# Patient Record
Sex: MALE | Race: BLACK OR AFRICAN AMERICAN | NOT HISPANIC OR LATINO | URBAN - METROPOLITAN AREA
[De-identification: names, ages, dates, MRNs, and addresses within clinical notes are randomized per-mention and may not be internally consistent; named-entity substitution may affect disease eponyms.]

---

## 2017-01-25 ENCOUNTER — OFFICE VISIT - HEALTHEAST (OUTPATIENT)
Dept: GERIATRICS | Facility: CLINIC | Age: 67
End: 2017-01-25

## 2017-01-25 DIAGNOSIS — I10 ESSENTIAL HYPERTENSION WITH GOAL BLOOD PRESSURE LESS THAN 130/85: ICD-10-CM

## 2017-01-25 DIAGNOSIS — S09.90XS COGNITIVE DEFICIT DUE TO OLD HEAD INJURY: ICD-10-CM

## 2017-01-25 DIAGNOSIS — I69.391 DYSPHAGIA AS LATE EFFECT OF STROKE: ICD-10-CM

## 2017-01-25 DIAGNOSIS — S06.9XAA TBI (TRAUMATIC BRAIN INJURY) (H): ICD-10-CM

## 2017-01-25 DIAGNOSIS — G82.22 INCOMPLETE PARAPLEGIA (H): ICD-10-CM

## 2017-01-25 DIAGNOSIS — R41.89 COGNITIVE DEFICIT DUE TO OLD HEAD INJURY: ICD-10-CM

## 2017-03-14 ENCOUNTER — OFFICE VISIT - HEALTHEAST (OUTPATIENT)
Dept: GERIATRICS | Facility: CLINIC | Age: 67
End: 2017-03-14

## 2017-03-14 DIAGNOSIS — R41.89 COGNITIVE DEFICIT DUE TO OLD HEAD INJURY: ICD-10-CM

## 2017-03-14 DIAGNOSIS — E78.5 DYSLIPIDEMIA: ICD-10-CM

## 2017-03-14 DIAGNOSIS — I10 ESSENTIAL HYPERTENSION WITH GOAL BLOOD PRESSURE LESS THAN 130/85: ICD-10-CM

## 2017-03-14 DIAGNOSIS — I69.90 LATE EFFECTS OF CVA (CEREBROVASCULAR ACCIDENT): ICD-10-CM

## 2017-03-14 DIAGNOSIS — S09.90XS COGNITIVE DEFICIT DUE TO OLD HEAD INJURY: ICD-10-CM

## 2017-03-14 DIAGNOSIS — N18.2 CRI (CHRONIC RENAL INSUFFICIENCY), STAGE 2 (MILD): ICD-10-CM

## 2017-03-14 DIAGNOSIS — G82.22 INCOMPLETE PARAPLEGIA (H): ICD-10-CM

## 2017-03-14 ASSESSMENT — MIFFLIN-ST. JEOR: SCORE: 1543.73

## 2017-04-26 ENCOUNTER — OFFICE VISIT - HEALTHEAST (OUTPATIENT)
Dept: GERIATRICS | Facility: CLINIC | Age: 67
End: 2017-04-26

## 2017-04-26 DIAGNOSIS — R41.89 COGNITIVE DEFICIT DUE TO OLD HEAD INJURY: ICD-10-CM

## 2017-04-26 DIAGNOSIS — S09.90XS COGNITIVE DEFICIT DUE TO OLD HEAD INJURY: ICD-10-CM

## 2017-04-26 DIAGNOSIS — G82.22 INCOMPLETE PARAPLEGIA (H): ICD-10-CM

## 2017-04-26 DIAGNOSIS — I10 ESSENTIAL HYPERTENSION WITH GOAL BLOOD PRESSURE LESS THAN 130/85: ICD-10-CM

## 2017-04-26 DIAGNOSIS — I69.391 DYSPHAGIA AS LATE EFFECT OF STROKE: ICD-10-CM

## 2017-05-23 ENCOUNTER — OFFICE VISIT - HEALTHEAST (OUTPATIENT)
Dept: GERIATRICS | Facility: CLINIC | Age: 67
End: 2017-05-23

## 2017-05-23 DIAGNOSIS — G82.22 INCOMPLETE PARAPLEGIA (H): ICD-10-CM

## 2017-05-23 DIAGNOSIS — I69.90 LATE EFFECTS OF CVA (CEREBROVASCULAR ACCIDENT): ICD-10-CM

## 2017-05-23 DIAGNOSIS — N18.2 CRI (CHRONIC RENAL INSUFFICIENCY), STAGE 2 (MILD): ICD-10-CM

## 2017-05-23 DIAGNOSIS — S09.90XS COGNITIVE DEFICIT DUE TO OLD HEAD INJURY: ICD-10-CM

## 2017-05-23 DIAGNOSIS — I10 ESSENTIAL HYPERTENSION WITH GOAL BLOOD PRESSURE LESS THAN 130/85: ICD-10-CM

## 2017-05-23 DIAGNOSIS — R41.89 COGNITIVE DEFICIT DUE TO OLD HEAD INJURY: ICD-10-CM

## 2017-05-23 DIAGNOSIS — E78.5 DYSLIPIDEMIA: ICD-10-CM

## 2017-05-23 ASSESSMENT — MIFFLIN-ST. JEOR: SCORE: 1543.27

## 2017-06-13 ENCOUNTER — OFFICE VISIT - HEALTHEAST (OUTPATIENT)
Dept: GERIATRICS | Facility: CLINIC | Age: 67
End: 2017-06-13

## 2017-06-13 DIAGNOSIS — I69.90 LATE EFFECTS OF CVA (CEREBROVASCULAR ACCIDENT): ICD-10-CM

## 2017-06-13 DIAGNOSIS — R41.89 COGNITIVE DEFICIT DUE TO OLD HEAD INJURY: ICD-10-CM

## 2017-06-13 DIAGNOSIS — N18.2 CRI (CHRONIC RENAL INSUFFICIENCY), STAGE 2 (MILD): ICD-10-CM

## 2017-06-13 DIAGNOSIS — S09.90XS COGNITIVE DEFICIT DUE TO OLD HEAD INJURY: ICD-10-CM

## 2017-06-13 DIAGNOSIS — I10 ESSENTIAL HYPERTENSION WITH GOAL BLOOD PRESSURE LESS THAN 130/85: ICD-10-CM

## 2017-06-13 DIAGNOSIS — G82.22 INCOMPLETE PARAPLEGIA (H): ICD-10-CM

## 2017-06-13 DIAGNOSIS — E78.5 DYSLIPIDEMIA: ICD-10-CM

## 2017-06-13 ASSESSMENT — MIFFLIN-ST. JEOR: SCORE: 1543.27

## 2017-07-25 ENCOUNTER — OFFICE VISIT - HEALTHEAST (OUTPATIENT)
Dept: GERIATRICS | Facility: CLINIC | Age: 67
End: 2017-07-25

## 2017-07-25 DIAGNOSIS — S06.9XAA TBI (TRAUMATIC BRAIN INJURY) (H): ICD-10-CM

## 2017-07-25 DIAGNOSIS — S09.90XS COGNITIVE DEFICIT DUE TO OLD HEAD INJURY: ICD-10-CM

## 2017-07-25 DIAGNOSIS — R41.89 COGNITIVE DEFICIT DUE TO OLD HEAD INJURY: ICD-10-CM

## 2017-07-25 DIAGNOSIS — I10 ESSENTIAL HYPERTENSION WITH GOAL BLOOD PRESSURE LESS THAN 130/85: ICD-10-CM

## 2017-07-25 DIAGNOSIS — N18.2 CRI (CHRONIC RENAL INSUFFICIENCY), STAGE 2 (MILD): ICD-10-CM

## 2017-08-29 ENCOUNTER — OFFICE VISIT - HEALTHEAST (OUTPATIENT)
Dept: GERIATRICS | Facility: CLINIC | Age: 67
End: 2017-08-29

## 2017-08-29 DIAGNOSIS — S09.90XS COGNITIVE DEFICIT DUE TO OLD HEAD INJURY: ICD-10-CM

## 2017-08-29 DIAGNOSIS — G82.22 INCOMPLETE PARAPLEGIA (H): ICD-10-CM

## 2017-08-29 DIAGNOSIS — I69.90 LATE EFFECTS OF CVA (CEREBROVASCULAR ACCIDENT): ICD-10-CM

## 2017-08-29 DIAGNOSIS — R41.89 COGNITIVE DEFICIT DUE TO OLD HEAD INJURY: ICD-10-CM

## 2017-08-29 DIAGNOSIS — E78.5 DYSLIPIDEMIA: ICD-10-CM

## 2017-08-29 DIAGNOSIS — I10 ESSENTIAL HYPERTENSION WITH GOAL BLOOD PRESSURE LESS THAN 130/85: ICD-10-CM

## 2017-08-29 ASSESSMENT — MIFFLIN-ST. JEOR: SCORE: 1544.18

## 2017-09-23 ENCOUNTER — OFFICE VISIT - HEALTHEAST (OUTPATIENT)
Dept: GERIATRICS | Facility: CLINIC | Age: 67
End: 2017-09-23

## 2017-09-23 DIAGNOSIS — I69.90 LATE EFFECTS OF CVA (CEREBROVASCULAR ACCIDENT): ICD-10-CM

## 2017-09-23 DIAGNOSIS — R41.89 COGNITIVE DEFICIT DUE TO OLD HEAD INJURY: ICD-10-CM

## 2017-09-23 DIAGNOSIS — G82.22 INCOMPLETE PARAPLEGIA (H): ICD-10-CM

## 2017-09-23 DIAGNOSIS — I10 ESSENTIAL HYPERTENSION WITH GOAL BLOOD PRESSURE LESS THAN 130/85: ICD-10-CM

## 2017-09-23 DIAGNOSIS — I69.322 DYSARTHRIA AS LATE EFFECT OF STROKE: ICD-10-CM

## 2017-09-23 DIAGNOSIS — S09.90XS COGNITIVE DEFICIT DUE TO OLD HEAD INJURY: ICD-10-CM

## 2017-09-23 DIAGNOSIS — E78.5 DYSLIPIDEMIA: ICD-10-CM

## 2017-09-23 DIAGNOSIS — N18.2 CRI (CHRONIC RENAL INSUFFICIENCY), STAGE 2 (MILD): ICD-10-CM

## 2017-09-23 ASSESSMENT — MIFFLIN-ST. JEOR: SCORE: 1544.18

## 2017-10-13 ENCOUNTER — OFFICE VISIT - HEALTHEAST (OUTPATIENT)
Dept: GERIATRICS | Facility: CLINIC | Age: 67
End: 2017-10-13

## 2017-10-13 DIAGNOSIS — S09.90XS COGNITIVE DEFICIT DUE TO OLD HEAD INJURY: ICD-10-CM

## 2017-10-13 DIAGNOSIS — I10 ESSENTIAL HYPERTENSION WITH GOAL BLOOD PRESSURE LESS THAN 130/85: ICD-10-CM

## 2017-10-13 DIAGNOSIS — I69.322 DYSARTHRIA AS LATE EFFECT OF STROKE: ICD-10-CM

## 2017-10-13 DIAGNOSIS — S06.9XAA TBI (TRAUMATIC BRAIN INJURY) (H): ICD-10-CM

## 2017-10-13 DIAGNOSIS — G82.22 INCOMPLETE PARAPLEGIA (H): ICD-10-CM

## 2017-10-13 DIAGNOSIS — R41.89 COGNITIVE DEFICIT DUE TO OLD HEAD INJURY: ICD-10-CM

## 2017-10-13 RX ORDER — CITALOPRAM HYDROBROMIDE 20 MG/1
20 TABLET ORAL DAILY
Status: SHIPPED | COMMUNITY
Start: 2017-10-13

## 2017-11-24 ENCOUNTER — OFFICE VISIT - HEALTHEAST (OUTPATIENT)
Dept: GERIATRICS | Facility: CLINIC | Age: 67
End: 2017-11-24

## 2017-11-24 DIAGNOSIS — S09.90XS COGNITIVE DEFICIT DUE TO OLD HEAD INJURY: ICD-10-CM

## 2017-11-24 DIAGNOSIS — I10 ESSENTIAL HYPERTENSION: ICD-10-CM

## 2017-11-24 DIAGNOSIS — E78.5 DYSLIPIDEMIA: ICD-10-CM

## 2017-11-24 DIAGNOSIS — I69.90 LATE EFFECTS OF CVA (CEREBROVASCULAR ACCIDENT): ICD-10-CM

## 2017-11-24 DIAGNOSIS — R41.89 COGNITIVE DEFICIT DUE TO OLD HEAD INJURY: ICD-10-CM

## 2017-11-24 DIAGNOSIS — N18.2 CRI (CHRONIC RENAL INSUFFICIENCY), STAGE 2 (MILD): ICD-10-CM

## 2017-11-24 DIAGNOSIS — G82.22 INCOMPLETE PARAPLEGIA (H): ICD-10-CM

## 2017-11-24 ASSESSMENT — MIFFLIN-ST. JEOR: SCORE: 1536.47

## 2018-01-05 ENCOUNTER — OFFICE VISIT - HEALTHEAST (OUTPATIENT)
Dept: GERIATRICS | Facility: CLINIC | Age: 68
End: 2018-01-05

## 2018-01-05 DIAGNOSIS — N18.2 CRI (CHRONIC RENAL INSUFFICIENCY), STAGE 2 (MILD): ICD-10-CM

## 2018-01-05 DIAGNOSIS — R41.89 COGNITIVE DEFICIT DUE TO OLD HEAD INJURY: ICD-10-CM

## 2018-01-05 DIAGNOSIS — G82.22 INCOMPLETE PARAPLEGIA (H): ICD-10-CM

## 2018-01-05 DIAGNOSIS — I69.90 LATE EFFECTS OF CVA (CEREBROVASCULAR ACCIDENT): ICD-10-CM

## 2018-01-05 DIAGNOSIS — I10 ESSENTIAL HYPERTENSION: ICD-10-CM

## 2018-01-05 DIAGNOSIS — S09.90XS COGNITIVE DEFICIT DUE TO OLD HEAD INJURY: ICD-10-CM

## 2018-01-07 ASSESSMENT — MIFFLIN-ST. JEOR: SCORE: 1536.47

## 2018-02-27 ENCOUNTER — OFFICE VISIT - HEALTHEAST (OUTPATIENT)
Dept: GERIATRICS | Facility: CLINIC | Age: 68
End: 2018-02-27

## 2018-02-27 DIAGNOSIS — S09.90XS COGNITIVE DEFICIT DUE TO OLD HEAD INJURY: ICD-10-CM

## 2018-02-27 DIAGNOSIS — R41.89 COGNITIVE DEFICIT DUE TO OLD HEAD INJURY: ICD-10-CM

## 2018-02-27 DIAGNOSIS — I69.391 DYSPHAGIA AS LATE EFFECT OF STROKE: ICD-10-CM

## 2018-02-27 DIAGNOSIS — S06.9XAA TBI (TRAUMATIC BRAIN INJURY) (H): ICD-10-CM

## 2018-02-27 DIAGNOSIS — G82.22 INCOMPLETE PARAPLEGIA (H): ICD-10-CM

## 2018-02-27 DIAGNOSIS — I69.90 LATE EFFECTS OF CVA (CEREBROVASCULAR ACCIDENT): ICD-10-CM

## 2018-02-27 DIAGNOSIS — I10 ESSENTIAL HYPERTENSION: ICD-10-CM

## 2018-03-02 ENCOUNTER — AMBULATORY - HEALTHEAST (OUTPATIENT)
Dept: GERIATRICS | Facility: CLINIC | Age: 68
End: 2018-03-02

## 2021-05-30 VITALS — HEIGHT: 70 IN | BODY MASS INDEX: 24.38 KG/M2 | WEIGHT: 170.3 LBS

## 2021-05-31 VITALS — BODY MASS INDEX: 24.37 KG/M2 | HEIGHT: 70 IN | WEIGHT: 170.2 LBS

## 2021-05-31 VITALS — HEIGHT: 70 IN | BODY MASS INDEX: 24.15 KG/M2 | WEIGHT: 168.7 LBS

## 2021-05-31 VITALS — HEIGHT: 70 IN | BODY MASS INDEX: 24.37 KG/M2 | WEIGHT: 170.2 LBS

## 2021-05-31 VITALS — BODY MASS INDEX: 24.15 KG/M2 | WEIGHT: 168.7 LBS | HEIGHT: 70 IN

## 2021-05-31 VITALS — WEIGHT: 170.4 LBS | BODY MASS INDEX: 24.39 KG/M2 | HEIGHT: 70 IN

## 2021-05-31 VITALS — BODY MASS INDEX: 24.39 KG/M2 | HEIGHT: 70 IN | WEIGHT: 170.4 LBS

## 2021-06-08 NOTE — PROGRESS NOTES
[facility]    Facility:   [facility] [[id number]]   Code Status: UNKNOWN      CHIEF COMPLAINT/REASON FOR VISIT:  Chief Complaint   Patient presents with     Review Of Multiple Medical Conditions     status post traumatic brain injury with shunt, cerebral infarction with left-sided weakness, hypertension, dysarthria.       HISTORY:      HPI: Elia is a 66 y.o. male who resides at the [facility] for multiple medical problems which include a traumatic brain injury with a recent stroke and left-sided weakness with some dysarthria. This is been relatively stable in the have been no new changes. He can move his left side and according to nursing staff he does ambulate pretty well and patient today claims he has no problems. On last visit I did order dressing free stage I plus ulcer on his buttocks and this has healed. He is doing well at this time and he is on multivitamins with minerals. He does have high blood pressure however his blood pressure is relatively normal at 132/74. He is on losartan/hydrochlorothiazide and I do not see a basic metabolic profile ordered for this. Patient is going to need a potassium level. Staff indicates he's doing well and have no new concerns.    History reviewed. No pertinent past medical history.          History reviewed. No pertinent family history.  Social History     Social History     Marital status: Single     Spouse name: N/A     Number of children: N/A     Years of education: N/A     Social History Main Topics     Smoking status: Unknown If Ever Smoked     Smokeless tobacco: None     Alcohol use None     Drug use: None     Sexual activity: Not Asked     Other Topics Concern     None     Social History Narrative         Review of Systems   Constitutional: Negative for activity change, appetite change, chills and fever.   HENT: Negative for hearing loss and rhinorrhea.    Eyes: Negative for visual disturbance.    Respiratory: Negative for chest tightness and shortness of breath.    Cardiovascular: Negative for chest pain and palpitations.   Gastrointestinal: Negative for abdominal pain, nausea and vomiting.   Endocrine: Negative for polydipsia, polyphagia and polyuria.   Genitourinary: Negative for decreased urine volume, dysuria and urgency.   Musculoskeletal: Negative for neck pain and neck stiffness.   Skin: Negative for rash.   Neurological: Positive for speech difficulty. Negative for dizziness, weakness and light-headedness.        Left-sided weakness has not changed.   Hematological: Does not bruise/bleed easily.   Psychiatric/Behavioral: Negative for behavioral problems and confusion. The patient is not nervous/anxious.        .  Vitals:    01/25/17 1000   BP: 132/74   Pulse: 78   Resp: 20   Temp: (!) 96.2  F (35.7  C)   SpO2: 95%       Physical Exam   Constitutional: No distress.   HENT:   Head: Normocephalic and atraumatic.   Nose: Nose normal.   Eyes: Conjunctivae and EOM are normal. Pupils are equal, round, and reactive to light. Right eye exhibits no discharge. No scleral icterus.   Neck: No tracheal deviation present. No thyromegaly present.   Cardiovascular: Normal rate, regular rhythm and normal heart sounds.  Exam reveals no gallop and no friction rub.    No murmur heard.  Pulmonary/Chest: No respiratory distress. He has no wheezes. He has no rales. He exhibits no tenderness.   Abdominal: He exhibits no mass. There is no tenderness. There is no rebound and no guarding.   Musculoskeletal: Normal range of motion. He exhibits no edema or tenderness.   Lymphadenopathy:     He has no cervical adenopathy.   Neurological: He is alert. He has normal reflexes. No cranial nerve deficit.   Patient has movement of his left side but is it is significant clinically weaker than the right. He has poor coordination of his left hand however he can reach up and grab my hand and he can raise his left leg off the bed. There  were no sensory deficits noted.   Skin: No rash noted. No erythema.   Psychiatric: He has a normal mood and affect. His behavior is normal.         LABS:       ASSESSMENT:      ICD-10-CM    1. 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 Incomplete paraplegia G82.22    3. TBI (traumatic brain injury) S06.9X9A    4. Essential hypertension with goal blood pressure less than 130/85 I10    5. Dysphagia as late effect of stroke I69.391        PLAN:  Plan at this time is to check a basic metabolic profile next lab day secondary to treatment with hydrochlorothiazide. I will continue to monitproblems and no other hiram care plan at this time. The ulcer on his buttock has healed over according to nursing staff however we will continue to monitor for any skin breakdown. This is going to be less likely because is ambulating much better at this time.      Total  minutes of which % was spent counseling and coordination of care of the above plan.    Electronically signed by: Anthony Stevens DO

## 2021-06-09 NOTE — PROGRESS NOTES
[facility]    Name:   Elia Mendosa  : 1950  Facility:   [facility] [[id number]]   Room: 208A  Code Status: FULL CODE -   Fac type:   NF (Long Term Care, LTC) -     CHIEF COMPLAINT / REASON FOR VISIT:  Chief Complaint   Patient presents with     Review Of Multiple Medical Conditions     Patient was last seen by me on 16 subsequently seen by Dr. Stevens on 17.    HPI: Elia is a 66 y.o. male with a history of traumatic brain injury that has left him with cognitive deficits (BIMS 3/15 recently). He also has a bit of dysarthria as a late effect of stroke along with incomplete paraplegia with some noted left-sided weakness. Nursing staff tell me that most of the time he doesn't sleep but watches TV. He even refuses to get out of bed, although he does take his medications and showers.  He is receiving Hyzaar for hypertension and atorvastatin for dyslipidemia.    Today, he is listening to a [employer] station on cable TV, as he usually does.  Previously, despite his cognitive deficits, he could recall the names of everyone in the [employer]. He can become easily agitated and tends to swear a lot. A complete review of systems provides no complaints.He is quite stable. There have been no changes to his care plan (including medications) since August. He did develop a small buttock wound, but this has been addressed, and I believe it is healed.    No headaches or chest pains, coughing or congestion, nausea or vomiting, dizziness or dyspnea, dysuria, constipation or diarrhea, or any problems with sleep.    No past medical history on file.           No family history on file.     Social History     Social History     Marital status: Single     Spouse name: N/A     Number of children: N/A     Years of education: N/A     Social History Main Topics     Smoking status: Unknown If Ever Smoked     Smokeless tobacco: Not on file     Alcohol use Not on file     Drug use:  "Not on file     Sexual activity: Not on file     Other Topics Concern     Not on file     Social History Narrative     MEDICATIONS: Reviewed from the MAR, physician orders, and earlier progress notes.  Current Outpatient Prescriptions   Medication Sig     aspirin 81 MG EC tablet Take 81 mg by mouth daily.     atorvastatin (LIPITOR) 40 MG tablet Take 40 mg by mouth bedtime.     losartan-hydrochlorothiazide (HYZAAR) 50-12.5 mg per tablet Take 1 tablet by mouth daily.     senna-docusate (SENNOSIDES-DOCUSATE SODIUM) 8.6-50 mg tablet Take 1 tablet by mouth daily.     ALLERGIES: No Known Allergies    DIET: Regular, mechanical soft texture, 2Cal supplement.    Vitals:    03/14/17 1718   BP: 120/72   Pulse: 78   Resp: 20   Temp: (!) 96.2  F (35.7  C)   Weight: 170 lb 4.8 oz (77.2 kg)   Height: 5' 10\" (1.778 m)     EXAMINATION:   General: Fairly pleasant middle-aged male, sitting in a wheelchair and watching television ([employer] channel), in no apparent distress.  Head: There is a bony AP ridge along the left side of his head from previous TBI.   Eyes: PERRLA, sclerae clear.   ENT: Moist oral mucosa. Edentulous on top. On the bottom, he only has his incisors and cuspids.  Cardiovascular: Regular rate and rhythm. No appreciable member.  Respiratory: Lungs clear to auscultation bilaterally.   Abdomen: Soft and nontender.   Musculoskeletal/Extremities: No peripheral edema.  Integument: No rashes, clinically significant lesions, or skin breakdown. He had a small blister in the back of his right thigh which I'm certain will heal without further intervention.  Cognitive/Psychiatric: Cognitive deficits (BIMS 6/15 on 10/20/16 and 3/15 on 01/10/17). Affect is euthymic.    DIAGNOSTICS:   1/31/17: Sodium 140, potassium 4.1, chloride 102, CO2 30, BUN 17, creatinine 1.31, GFR >60.    ASSESSMENT/Plan:      ICD-10-CM    1. 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 Essential hypertension with goal blood pressure less " than 130/85 I10    3. Incomplete paraplegia G82.22    4. Late effects of CVA (cerebrovascular accident) I69.90    5. CRI (chronic renal insufficiency), stage 2 (mild) N18.2    6. Dyslipidemia E78.5      CHANGES:    None.    CARE PLAN:    The care plan has been reviewed and all orders signed. Changes to care plan, if any, as noted. Otherwise, continue care plan of care.      Electronically signed by: Nguyễn Higgins CNP

## 2021-06-10 NOTE — PROGRESS NOTES
[facility]    Facility:   [facility] [[id number]]   Code Status: UNKNOWN      CHIEF COMPLAINT/REASON FOR VISIT:  Chief Complaint   Patient presents with     Review Of Multiple Medical Conditions     Essential hypertension, distant traumatic brain injury, dysarthria, cognitive deficit due to old head injury.       HISTORY:      HPI: Elia is a 66 y.o. male who resides here in the long-term care at [facility] secondary to multiple medical problems which I will list below.  He does have a traumatic brain injury also essential hypertension with dysarthria secondary to late effect stroke.  He does have incomplete paraplegia however he does ambulate without difficulty.  According to staff there are no new concerns at this time.  Although patient does indicate to me that he does not walk staff indicate to me that he walks quite often.  According to the physical therapists I did discuss his case with today he does walk up and down the halls holding onto the railing and nurse does indicate to me that he does walk with assistance he is on a walking program.  His blood pressures have been under good control and he has no new concerns today.  He does have some left-sided weakness which has not changed since last exam and he does have a  shunt in place.  He has no other concerns.    History reviewed. No pertinent past medical history.          History reviewed. No pertinent family history.  Social History     Social History     Marital status: Single     Spouse name: N/A     Number of children: N/A     Years of education: N/A     Social History Main Topics     Smoking status: Unknown If Ever Smoked     Smokeless tobacco: None     Alcohol use None     Drug use: None     Sexual activity: Not Asked     Other Topics Concern     None     Social History Narrative         Review of Systems   Constitutional: Negative for activity change, appetite change, chills and fever.    HENT: Negative for hearing loss and rhinorrhea.    Eyes: Negative for visual disturbance.   Respiratory: Negative for chest tightness and shortness of breath.    Cardiovascular: Negative for chest pain and palpitations.   Gastrointestinal: Negative for abdominal pain, nausea and vomiting.   Endocrine: Negative for polydipsia, polyphagia and polyuria.   Genitourinary: Negative for decreased urine volume, dysuria and urgency.   Musculoskeletal: Negative for neck pain and neck stiffness.   Skin: Negative for rash.   Neurological: Negative for dizziness, weakness and light-headedness.   Hematological: Does not bruise/bleed easily.   Psychiatric/Behavioral: Negative for behavioral problems and confusion. The patient is not nervous/anxious.        .  Vitals:    04/26/17 0837   BP: 128/78   Pulse: 78   Resp: 20   Temp: (!) 96.5  F (35.8  C)   SpO2: 95%       Physical Exam   Constitutional: No distress.   HENT:   Head: Normocephalic and atraumatic.   Nose: Nose normal.   Eyes: Conjunctivae and EOM are normal. Pupils are equal, round, and reactive to light. Right eye exhibits no discharge. No scleral icterus.   Neck: No tracheal deviation present. No thyromegaly present.   Cardiovascular: Normal rate, regular rhythm and normal heart sounds.  Exam reveals no gallop and no friction rub.    No murmur heard.  Pulmonary/Chest: No respiratory distress. He has no wheezes. He has no rales. He exhibits no tenderness.   Abdominal: He exhibits no mass. There is no tenderness. There is no rebound and no guarding.   Musculoskeletal: Normal range of motion. He exhibits no edema or tenderness.   Lymphadenopathy:     He has no cervical adenopathy.   Neurological: He is alert. He has normal reflexes.   Patient still has a left-sided facial droop and he has left-sided weakness compared to the right.  This is no change since last exam and he is no sensory deficits at this time.   Psychiatric: He has a normal mood and affect.         LABS:  Laboratory values from January 31, 2017 are as follows; BUN was 17, glucose was 93, calcium was 9.5, creatinine was elevated at 1.31, GFR was 57, sodium and potassium were within normal limits and carbon dioxide was 30.      ASSESSMENT:      ICD-10-CM    1. 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 Essential hypertension with goal blood pressure less than 130/85 I10    3. Dysphagia as late effect of stroke I69.391    4. Incomplete paraplegia G82.22        PLAN: Plan at this time because he has has some chronic kidney disease I will recommend that we do a basic metabolic profile next lab day.  I will see where this has changed since January and I will continue to monitor above medical problems.  No other changes to care plan at this time his blood pressure is in good control and he has not changed since her last exam.      Total  minutes of which % was spent counseling and coordination of care of the above plan.    Electronically signed by: Anthony Stevens DO

## 2021-06-10 NOTE — PROGRESS NOTES
"[facility]    Name:   Elia Mendosa  : 1950  Facility:   [facility] [[id number]]   Room: 208A  Code Status: FULL CODE -   Fac type:   NF (Long Term Care, LTC) -     CHIEF COMPLAINT / REASON FOR VISIT:  Chief Complaint   Patient presents with     Review Of Multiple Medical Conditions     Patient was last seen by me on 17 subsequently seen by Dr. Stevens on 17.    HPI: Elia is a 66 y.o. male with a history of traumatic brain injury that has left him with cognitive deficits (BIMS 3/15 recently). He also has a bit of dysarthria as a late effect of stroke along with incomplete paraplegia with some noted left-sided weakness. Nursing staff tell me that most of the time he doesn't sleep but watches TV. He even refuses to get out of bed, although he does take his medications and showers.  He is receiving Hyzaar for hypertension and atorvastatin for dyslipidemia.    He typically can be found listening to a [employer] station on [employer] TV, but he is currently watching a show on [employer].  Despite his cognitive deficits, he could recall the names of everyone in the [employer].  Today, he guesses the year as \"2015,\" and for the month, he simply says, \"nope.\"  He can become easily agitated and tends to swear a lot. A complete review of systems provides no complaints.He is quite stable. There have been no changes to his care plan (including medications) since August. He did develop a small buttock wound, but this has been addressed, and it is being treated with zinc oxide ointment.    No headaches or chest pains, coughing or congestion, nausea or vomiting, dizziness or dyspnea, dysuria, constipation or diarrhea, or any problems with sleep.    No past medical history on file.           No family history on file.     Social History     Social History     Marital status: Single     Spouse name: N/A     Number of children: N/A     Years of education: N/A     Social History " "Main Topics     Smoking status: Unknown If Ever Smoked     Smokeless tobacco: Not on file     Alcohol use Not on file     Drug use: Not on file     Sexual activity: Not on file     Other Topics Concern     Not on file     Social History Narrative     MEDICATIONS: Reviewed from the MAR, physician orders, and earlier progress notes.  Current Outpatient Prescriptions   Medication Sig     zinc oxide 20 % ointment Apply topically as needed. To right buttock until healed.     aspirin 81 MG EC tablet Take 81 mg by mouth daily.     atorvastatin (LIPITOR) 40 MG tablet Take 40 mg by mouth bedtime.     losartan-hydrochlorothiazide (HYZAAR) 50-12.5 mg per tablet Take 1 tablet by mouth daily.     senna-docusate (SENNOSIDES-DOCUSATE SODIUM) 8.6-50 mg tablet Take 1 tablet by mouth daily.     ALLERGIES: No Known Allergies    DIET: Regular, mechanical soft texture, 2Cal supplement.    Vitals:    05/23/17 1722   BP: 122/68   Pulse: 82   Resp: 20   Temp: 97  F (36.1  C)   Weight: 170 lb 3.2 oz (77.2 kg)   Height: 5' 10\" (1.778 m)     EXAMINATION:   General: Fairly pleasant middle-aged male, sitting in a wheelchair, in no apparent distress.  Head: There is a bony AP ridge along the left side of his head from previous TBI.   Eyes: PERRLA, sclerae clear.   ENT: Moist oral mucosa. Edentulous on top. On the bottom, he only has his incisors and cuspids.  Cardiovascular: Regular rate and rhythm. No appreciable member.  Respiratory: Lungs clear to auscultation bilaterally.   Abdomen: Soft and nontender.   Musculoskeletal/Extremities: Locked right index finger with enlarged PIP.  No peripheral edema.  Integument: No rashes, clinically significant lesions, or skin breakdown. He had a small blister in the back of his right thigh which I'm certain will heal without further intervention.  Cognitive/Psychiatric: Cognitive deficits (BIMS 6/15 on 10/20/16, 3/15 on 01/10/17, and 6/15 on 04/10/17). Affect is euthymic.    DIAGNOSTICS:   05/02/17: Sodium " 141, potassium 4.2, chloride 103, CO2 28, BUN 23, creatinine 1.35, estimated GFR >60.    ASSESSMENT/Plan:      ICD-10-CM    1. 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 Essential hypertension with goal blood pressure less than 130/85 I10    3. Incomplete paraplegia G82.22    4. Late effects of CVA (cerebrovascular accident) I69.90    5. CRI (chronic renal insufficiency), stage 2 (mild) N18.2    6. Dyslipidemia E78.5      CHANGES:    None.    CARE PLAN:    The care plan has been reviewed and all orders signed. Changes to care plan, if any, as noted. Otherwise, continue care plan of care.      Electronically signed by: Nguyễn Higgins CNP

## 2021-06-11 NOTE — PROGRESS NOTES
"[facility]    Name:   Elia Mendosa  : 1950  [facility][id number][facility]   Room: 208A  Code Status: FULL CODE -   Fac type:   NF (Long Term Care, LTC) -     CHIEF COMPLAINT / REASON FOR VISIT:  Chief Complaint   Patient presents with     Review Of Multiple Medical Conditions     Patient was last seen on 2017    HPI: Elia is a 66 y.o. male with a history of traumatic brain injury that has left him with cognitive deficits (BIMS 3/15 recently). He also has dysarthria as a late effect of stroke along with incomplete paraplegia with some noted left-sided weakness. In has been reported in the past that he doesn't sleep but watches TV. He often times refuses to get out of bed, although he does take his medications and showers. He can become easily agitated and tends to swear a lot. Nursing staff state that he has become more verbally aggressive with some physical aggression, as of late.  Today, he greets people pleasantly in the hallway.  He is receiving Hyzaar for hypertension and atorvastatin for dyslipidemia.    He typically enjoys listening to a [employer] station and [employer] on cable TV.  At times, he is unable to answer date questions, stating the year as \"\".There have been no changes to his care plan (including medications) since August. He did develop a small buttock wound in the past 2 months but this was treated and there are no reports of issues.     A complete review of systems provides no complaints.  No headaches or chest pains, coughing or congestion, nausea or vomiting, dizziness or dyspnea, dysuria, constipation or diarrhea, or any problems with sleep. He appears quite stable.    No past medical history on file.           No family history on file.     Social History     Social History     Marital status: Single     Spouse name: N/A     Number of children: N/A     Years of education: N/A     Social History Main Topics     Smoking status: Unknown If " "Ever Smoked     Smokeless tobacco: Not on file     Alcohol use Not on file     Drug use: Not on file     Sexual activity: Not on file     Other Topics Concern     Not on file     Social History Narrative     MEDICATIONS: Reviewed from the MAR, physician orders, and earlier progress notes.  Current Outpatient Prescriptions   Medication Sig     aspirin 81 MG EC tablet Take 81 mg by mouth daily.     atorvastatin (LIPITOR) 40 MG tablet Take 40 mg by mouth bedtime.     losartan-hydrochlorothiazide (HYZAAR) 50-12.5 mg per tablet Take 1 tablet by mouth daily.     senna-docusate (SENNOSIDES-DOCUSATE SODIUM) 8.6-50 mg tablet Take 1 tablet by mouth daily.     zinc oxide 20 % ointment Apply topically as needed. To right buttock until healed.     ALLERGIES: No Known Allergies    DIET: Regular, mechanical soft texture, 2Cal supplement.    Vitals:    06/13/17 1455   BP: 124/64   Pulse: 69   Resp: 20   Temp: (!) 95  F (35  C)   Weight: 170 lb 3.2 oz (77.2 kg)   Height: 5' 10\" (1.778 m)   Infrequent weights.  Wt of 170 pounds last taken on 4/1/2017    EXAMINATION:   General: Fairly pleasant middle-aged male, sitting in a wheelchair, in no apparent distress.  Head: There is a bony AP ridge along the left side of his head from previous TBI.   Eyes: PERRLA, sclerae clear.   ENT: Moist oral mucosa. Edentulous on top except for rear molars. On the bottom, he only has his incisors and cuspids.  Cardiovascular: Regular rate and rhythm. No appreciable murmur.  Respiratory: Lungs clear to auscultation bilaterally.   Abdomen: Soft and nontender.   Musculoskeletal/Extremities: Locked right index finger with enlarged PIP.  No peripheral edema.  Integument: No rashes, clinically significant lesions, or skin breakdown.   Cognitive/Psychiatric: Cognitive deficits (BIMS 6/15 on 10/20/16, 3/15 on 01/10/17, and 6/15 on 04/10/17). Affect is euthymic.    DIAGNOSTICS:   05/02/17: Sodium 141, potassium 4.2, chloride 103, CO2 28, BUN 23, creatinine 1.35, " estimated GFR >60.    ASSESSMENT/Plan:      ICD-10-CM    1. 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 Essential hypertension with goal blood pressure less than 130/85 I10    3. Incomplete paraplegia G82.22    4. Late effects of CVA (cerebrovascular accident) I69.90    5. CRI (chronic renal insufficiency), stage 2 (mild) N18.2    6. Dyslipidemia E78.5      CHANGES:    None.    CARE PLAN:    The care plan has been reviewed and all orders signed. Changes to care plan, if any, as noted. Otherwise, continue care plan of care.      Electronically signed by: Edel Dillon I, Edel Dillon, am scribing for and in the presence of, POONAM Roberts.

## 2021-06-12 NOTE — PROGRESS NOTES
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       HISTORY:      HPI: Elia is a 66 y.o. male who resides here at the [facility] for multiple medical problems he did have a traumatic brain injury and he is upstairs in the long-term care facility does have hypertension which is stayed relatively stable and dysarthria secondary to late late effect stroke.  He has incomplete paraplegia however he does ambulate without difficulty according to staff.  They have no concerns in this time and he has not changed too much.  He does walk up and down the halls holding onto something most of the time and he does have a  shunt in place.    According to nursing staff he does have some behavior outbursts.  He does wear itself staff sometimes and he is resistant to cares sometimes all around he seems very pleasant today and I believe he is being followed by psychiatry on but I will find that out.  He has no other concerns today.    History reviewed. No pertinent past medical history.          History reviewed. No pertinent family history.  Social History     Social History     Marital status: Single     Spouse name: N/A     Number of children: N/A     Years of education: N/A     Social History Main Topics     Smoking status: Unknown If Ever Smoked     Smokeless tobacco: None     Alcohol use None     Drug use: None     Sexual activity: Not Asked     Other Topics Concern     None     Social History Narrative         Review of Systems   Constitutional: Negative for activity change, appetite change, chills and fever.   HENT: Negative for hearing loss and rhinorrhea.    Eyes: Negative for visual disturbance.   Respiratory:  Negative for chest tightness and shortness of breath.    Cardiovascular: Negative for chest pain and palpitations.   Gastrointestinal: Negative for abdominal pain, nausea and vomiting.   Endocrine: Negative for polydipsia, polyphagia and polyuria.   Genitourinary: Negative for decreased urine volume, dysuria and urgency.   Musculoskeletal: Negative for neck pain and neck stiffness.   Skin: Negative for rash.   Neurological: Negative for dizziness, weakness and light-headedness.   Hematological: Does not bruise/bleed easily.   Psychiatric/Behavioral: Negative for behavioral problems and confusion. The patient is not nervous/anxious.        .  Vitals:    07/25/17 1150   BP: 142/84   Pulse: 76   Resp: 16   Temp: 97.6  F (36.4  C)   SpO2: 94%       Physical Exam   Constitutional: No distress.   HENT:   Head: Normocephalic and atraumatic.   Nose: Nose normal.   Eyes: Conjunctivae and EOM are normal. Pupils are equal, round, and reactive to light. Right eye exhibits no discharge. No scleral icterus.   Neck: No tracheal deviation present. No thyromegaly present.   Cardiovascular: Normal rate, regular rhythm and normal heart sounds.  Exam reveals no gallop and no friction rub.    No murmur heard.  Pulmonary/Chest: No respiratory distress. He has no wheezes. He has no rales. He exhibits no tenderness.   Abdominal: He exhibits no mass. There is no tenderness. There is no rebound and no guarding.   Musculoskeletal: Normal range of motion. He exhibits no edema or tenderness.   Lymphadenopathy:     He has no cervical adenopathy.   Neurological: He is alert. He has normal reflexes.   Patient's cranial nerves II through XII are intact.  He does have marked weakness on his left side compared to his right side with movement but his reflexes are symmetrical.  He does have weakness to dorsal flexion on the left side and his left arm weakness is evident.  He can move all 4 extremities without any issues.  No sensory deficits were noted  on exam.   Skin: No rash noted. No erythema.   Psychiatric:   Affect is pleasant today         LABS: Laboratory values from 5/2/2017 are as follows glucose was 87, creatinine was 1.35, GFR is greater than 60, BUN was 23, sodium was 141, potassium was 4.2, carbon dioxide is 28.  Calcium was 9.5,.      ASSESSMENT:      ICD-10-CM    1. CRI (chronic renal insufficiency), stage 2 (mild) N18.2    2. Cognitive deficit due to old head injury F06.8     S09.90XS    3. Essential hypertension with goal blood pressure less than 130/85 I10    4. TBI (traumatic brain injury) S06.9X9A        PLAN: I will continue the care plan as indicated likely have him see psychiatry.  He has no change since last exam too much and will continue to monitor above medical problems.  I will also get a basic metabolic profile next month to follow his kidney function and his blood pressure is in good control at this time.  No other changes to care plan at this time.      Total  minutes of which % was spent counseling and coordination of care of the above plan.    Electronically signed by: Anthony Stevens DO
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
## 2021-06-13 NOTE — PROGRESS NOTES
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       HISTORY:      HPI: Elia is a 67 y.o. male resides here at the long-term care facility at the [facility] secondary to multiple medical problems he does have a distant traumatic brain injury with dysarthria and also cognitive deficits due to this head injury.  He does have a late effect stroke and incomplete paraplegia however staff indicates he does ambulate.  Patient was recently put on Celexa secondary to behavior issues.  This including yelling at staff and we are waiting to see if this improves.    Although patient has claimed he was suicidal one time and he was yelling out since he started the citalopram he seems to be doing much better.  He denies any suicidal ideation at this time he says he is doing fine.  His main concern is he thinks people do not listen to him here however I believe they are listening at this time.  He is doing okay and has no new concerns.    History reviewed. No pertinent past medical history.          History reviewed. No pertinent family history.  Social History     Social History     Marital status: Single     Spouse name: N/A     Number of children: N/A     Years of education: N/A     Social History Main Topics     Smoking status: Unknown If Ever Smoked     Smokeless tobacco: None     Alcohol use None     Drug use: None     Sexual activity: Not Asked     Other Topics Concern     None     Social History Narrative         Review of Systems   Constitutional:        Patient denies any pain fevers chills nausea vomiting diarrhea change in vision hearing taste or smell weakness one side the other chest pain or shortness of breath.  He does move his lower extremities has  good strength in his lower extremities to testing however he only ambulates sometimes when he wants to.  The remainder the review of systems is negative.       .  Vitals:    10/13/17 0822   BP: 132/58   Pulse: 66   Resp: 20   Temp: 97.5  F (36.4  C)   SpO2: 96%       Physical Exam   Constitutional: No distress.   HENT:   Nose: Nose normal.   Eyes: Right eye exhibits no discharge. Left eye exhibits no discharge. No scleral icterus.   Cardiovascular: Normal rate and regular rhythm.    Pulmonary/Chest: Effort normal and breath sounds normal. No respiratory distress. He has no wheezes.   Abdominal: Soft. Bowel sounds are normal. He exhibits no distension.   Musculoskeletal: He exhibits no edema or tenderness.   Neurological: He is alert. A cranial nerve deficit is present. He exhibits abnormal muscle tone. Coordination abnormal.   Neurologic examination has not changed since last exam.   Skin: Skin is warm and dry. He is not diaphoretic.   Psychiatric: He has a normal mood and affect. His behavior is normal.         LABS: Reviewed.      ASSESSMENT:      ICD-10-CM    1. 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 Essential hypertension with goal blood pressure less than 130/85 I10    3. Incomplete paraplegia G82.22    4. TBI (traumatic brain injury) S06.9X9A    5. Dysarthria as late effect of stroke I69.322        PLAN: Plan at this time is continue to monitor above medical problems agree with the Celexa at this time and I will defer his psychiatric care to his psychiatrist.  Other than that there are no new changes to care plan at this time.  Care plan was reviewed and is appropriate.      Total  minutes of which % was spent counseling and coordination of care of the above plan.    Electronically signed by: Anthony Stevens DO
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
## 2021-06-15 PROBLEM — N18.2 CRI (CHRONIC RENAL INSUFFICIENCY), STAGE 2 (MILD): Status: ACTIVE | Noted: 2017-03-14

## 2021-06-15 PROBLEM — E78.5 DYSLIPIDEMIA: Status: ACTIVE | Noted: 2017-03-14
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
## 2021-06-16 NOTE — PROGRESS NOTES
[facility]    Facility:   [facility] [[id number]]   Code Status: UNKNOWN      CHIEF COMPLAINT/REASON FOR VISIT:  Chief Complaint   Patient presents with     Review Of Multiple Medical Conditions     History of traumatic brain injury, cognitive deficits with a Bihm score of 3 out of 15, dysarthria, incomplete paraplegia with left-sided weakness.  Hypertension, chronic renal insufficiency stage II which is mild.       HISTORY:      HPI: Elia is a 67 y.o. male who resides here at the [facility] with cognitive deficits and a pain score of 3 out of 15 recently.  He also has late effect stroke with incomplete paraplegia with some noted left-sided weakness.  Hypertension with systolics and diastolics have been within adequate range at this time.  He does sit in his room and watches TV constantly.  He does refuse some time to get out of bed but he does get up.  He does get up for meals and he gets agitated sometimes with staff and gets verbally abusive and is being followed by psychiatry.  He he did get added some hydroxyzine 25 mg twice daily as needed for agitation and aggression anxiety on 8/30/2017 but it was this was discontinued.  Instead they started on citalopram 20 mg in the morning.  He he has done pretty well with this at this time and staff have no new concerns at this time.    Patient is very pleasant today much more pleasant has been in the past and says he is doing fine without any issues.  His stroke effect has not changed at all at this time and he does have more ability at this time to get out of his room and go to his meals.  His left-sided weakness has stayed the same and his blood pressure remains normotensive.  A recent basic metabolic profile should be checked at this time secondary to his chronic kidney disease.  Deaf have no concerns.    History reviewed. No pertinent past medical history.          History reviewed. No pertinent family  history.  Social History     Social History     Marital status: Single     Spouse name: N/A     Number of children: N/A     Years of education: N/A     Social History Main Topics     Smoking status: Unknown If Ever Smoked     Smokeless tobacco: None     Alcohol use None     Drug use: None     Sexual activity: Not Asked     Other Topics Concern     None     Social History Narrative         Review of Systems   Constitutional:        His review of systems positive left-sided weakness some dysarthria and dysphagia.  He does not show any signs of aspiration pneumonia at this time and he denies any fevers chills nausea vomiting diarrhea chest pain or shortness of breath.  He is moving his bowels well at this time and there is no urgency frequency or burning with urination.  Main to the review of systems is negative.       .  Vitals:    02/27/18 0805   BP: 121/72   Pulse: 72   Resp: 18   Temp: 97.8  F (36.6  C)   SpO2: 98%       Physical Exam   Constitutional: No distress.   HENT:   Head: Atraumatic.   Nose: Nose normal.   Mouth/Throat: No oropharyngeal exudate.   Eyes: Right eye exhibits no discharge. Left eye exhibits no discharge. No scleral icterus.   Neck: Neck supple. No thyromegaly present.   Cardiovascular: Normal rate and regular rhythm.    Pulmonary/Chest: Effort normal and breath sounds normal. No respiratory distress. He has no wheezes.   Abdominal: Soft. Bowel sounds are normal. He exhibits no distension. There is no tenderness.   Lymphadenopathy:     He has no cervical adenopathy.   Neurological: He is alert.   She still exhibits left-sided weakness with slight left facial droop with flexion of his facial muscles.  No no changes in his neuro exam since last visit.   Skin: Skin is warm and dry. He is not diaphoretic.   Psychiatric: He has a normal mood and affect. His behavior is normal.         LABS: Patient's creatinine was 1.35 on 5/2/2017.  Sodium was 141 potassium 4.2 chloride 103 CO2 28 BUN was 23.  GFR  was greater than 60.      ASSESSMENT:      ICD-10-CM    1. 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 Incomplete paraplegia G82.22    3. Late effects of CVA (cerebrovascular accident) I69.90    4. Essential hypertension I10    5. TBI (traumatic brain injury) S06.9X9A    6. Dysphagia as late effect of stroke I69.391        PLAN: Plan at this time would recommend a basic metabolic profile next lab day and continue to monitor above medical problems.  Seems patient is doing much better with the addition of citalopram at this time and there is no signs of any aspiration.  His blood pressure is normotensive at this time so we will continue to monitor above medical problems and no other changes to care plan at this time.      Total  minutes of which % was spent counseling and coordination of care of the above plan.    Electronically signed by: Anthony Stevens DO